# Patient Record
Sex: FEMALE | URBAN - METROPOLITAN AREA
[De-identification: names, ages, dates, MRNs, and addresses within clinical notes are randomized per-mention and may not be internally consistent; named-entity substitution may affect disease eponyms.]

---

## 2017-01-08 ENCOUNTER — TRANSFERRED RECORDS (OUTPATIENT)
Dept: HEALTH INFORMATION MANAGEMENT | Facility: CLINIC | Age: 82
End: 2017-01-08

## 2017-01-18 ENCOUNTER — TRANSFERRED RECORDS (OUTPATIENT)
Dept: HEALTH INFORMATION MANAGEMENT | Facility: CLINIC | Age: 82
End: 2017-01-18

## 2017-02-01 DIAGNOSIS — H40.2213 CHRONIC ANGLE-CLOSURE GLAUCOMA OF RIGHT EYE, SEVERE STAGE: Primary | ICD-10-CM

## 2017-02-02 ENCOUNTER — APPOINTMENT (OUTPATIENT)
Dept: OPHTHALMOLOGY | Facility: CLINIC | Age: 82
End: 2017-02-02
Attending: OPHTHALMOLOGY
Payer: COMMERCIAL

## 2017-02-02 DIAGNOSIS — H40.2213 CHRONIC ANGLE-CLOSURE GLAUCOMA OF RIGHT EYE, SEVERE STAGE: ICD-10-CM

## 2017-02-02 PROCEDURE — 99213 OFFICE O/P EST LOW 20 MIN: CPT | Mod: ZF

## 2017-02-02 PROCEDURE — 92083 EXTENDED VISUAL FIELD XM: CPT | Mod: ZF | Performed by: OPHTHALMOLOGY

## 2017-02-02 ASSESSMENT — TONOMETRY
OD_IOP_MMHG: 14
IOP_METHOD: TONOPEN
OS_IOP_MMHG: 07

## 2017-02-02 ASSESSMENT — REFRACTION_WEARINGRX
OS_SPHERE: BALANCE
OD_ADD: +3.00
OD_CYLINDER: +1.00
OD_AXIS: 095
OD_SPHERE: -3.00

## 2017-02-02 ASSESSMENT — VISUAL ACUITY
METHOD: SNELLEN - LINEAR
OS_CC: LP&P
OD_CC+: -1
OD_CC: 20/50

## 2017-02-02 ASSESSMENT — CONF VISUAL FIELD
OD_NORMAL: 1
OS_INFERIOR_TEMPORAL_RESTRICTION: 1
METHOD: COUNTING FINGERS
OS_INFERIOR_NASAL_RESTRICTION: 1
OS_SUPERIOR_NASAL_RESTRICTION: 1
OS_SUPERIOR_TEMPORAL_RESTRICTION: 1

## 2017-02-02 ASSESSMENT — SLIT LAMP EXAM - LIDS
COMMENTS: NORMAL
COMMENTS: NORMAL

## 2017-02-02 ASSESSMENT — CUP TO DISC RATIO: OD_RATIO: 0.8

## 2017-02-02 NOTE — PROGRESS NOTES
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 Palafox -- s/p Yag laser disruption of anterior hyaloid face (10/13/15) -- stable    Patient will continue on the Cosopt (Timolol/Dorzolamide) which is a blue top drop 2x/day (12 hours apart) in the right eye.  Patient will return to clinic in 4-6 months with repeat IOP check, Allen Visual Field test, and dilated eye exam.  Patient will also follow up with Dr. Moreno for retina evaluation.

## 2017-02-02 NOTE — MR AVS SNAPSHOT
After Visit Summary   2/2/2017    Sujata Valentino    MRN: [id number]           Patient Information     Date Of Birth          4/15/1926        Visit Information        Provider Department      2/2/2017 10:30 AM [facility]        Today's Diagnoses     Chronic angle-closure glaucoma of right eye, severe stage           Care Instructions    Patient will continue on the Cosopt (Timolol/Dorzolamide) which is a blue top drop 2x/day (12 hours apart) in the right eye.  Patient will return to clinic in 4-6 months with repeat IOP check, Allen Visual Field test, and dilated eye exam.  Patient will also follow up with Dr. Moreno for retina evaluation.        Follow-ups after your visit        Follow-up notes from your care team     Return for 4-6 months with repeat IOP check, Allen Visual Field test, and dilated eye exam.      Who to contact     Please call your clinic at [phone] to:    Ask questions about your health    Make or cancel appointments    Discuss your medicines    Learn about your test results    Speak to your doctor   If you have compliments or concerns about an experience at your clinic, or if you wish to file a complaint, please contact [facility] Patient Relations at [phone] or email us at [email or web address]         Additional Information About Your Visit        MyChart Information     [employer] gives you secure access to your electronic health record. If you see a primary care provider, you can also send messages to your care team and make appointments. If you have questions, please call your primary care clinic.  If you do not have a primary care provider, please call [phone] and they will assist you.      [employer] is an electronic gateway that provides easy, online access to your medical records. With [employer], you can request a clinic appointment, read your test results, renew a prescription or communicate with your  care team.     To access your existing account, please contact your [facility] or call [phone] for assistance.        Care EveryWhere ID     This is your Care EveryWhere ID. This could be used by other organizations to access your [place] medical records  [id number]         Blood Pressure from Last 3 Encounters:   No data found for BP    Weight from Last 3 Encounters:   No data found for Wt              We Performed the Following     Allen VF OU        Primary Care Provider    No Pcp Confirmed       No address on file        Thank you!     Thank you for choosing [facility]  for your care. Our goal is always to provide you with excellent care. Hearing back from our patients is one way we can continue to improve our services. Please take a few minutes to complete the written survey that you may receive in the mail after your visit with us. Thank you!             Your Updated Medication List - Protect others around you: Learn how to safely use, store and throw away your medicines at www.disposemymeds.org.          This list is accurate as of: 2/2/17 12:38 PM.  Always use your most recent med list.                   Brand Name Dispense Instructions for use    * acetaminophen 325 MG tablet    TYLENOL     Take 325-650 mg by mouth every 6 hours as needed       * acetaminophen 325 MG tablet    TYLENOL     Take 2 tablets by mouth as needed In addition to scheduled doses as ordered       alum & mag hydroxide-simethicone 200-200-20 MG/5ML Susp suspension    MYLANTA/MAALOX     Take 30 mLs by mouth every 4 hours as needed       aspirin 81 MG tablet      Take 1 tablet by mouth daily       atropine 1 % ophthalmic solution     1 Bottle    Place 1 drop into the right eye 2 times daily       donepezil 10 MG tablet    ARICEPT     Take 10 mg by mouth At Bedtime       dorzolamide 2 % ophthalmic solution    TRUSOPT     Place 1 drop into the right eye 2 times daily       dorzolamide-timolol 2-0.5 %  ophthalmic solution    COSOPT    1 Bottle    Place 1 drop into the right eye 2 times daily       fexofenadine 180 MG tablet    ALLEGRA     Take 1 tablet by mouth daily as needed       fluticasone 50 MCG/ACT spray    FLONASE     Spray 2 sprays into both nostrils daily       furosemide 20 MG tablet    LASIX     Take 20 mg by mouth daily       hypromellose 0.4 % Soln ophthalmic solution    ARTIFICIAL TEARS     Place 1 drop into both eyes every hour as needed       lisinopril 20 MG tablet    PRINIVIL/ZESTRIL     Take 20 mg by mouth 2 times daily       * loperamide 2 MG capsule    IMODIUM     Take 2 mg by mouth 4 times daily as needed       * IMODIUM A-D PO      Take 2 mg by mouth as needed As needed for diarrhea       melatonin 3 MG tablet      Take 1 tablet by mouth At Bedtime       polyethylene glycol powder    MIRALAX/GLYCOLAX     Take 1 capful by mouth daily as needed       prednisoLONE acetate 1 % ophthalmic susp    PRED FORTE     Place 1 drop into the right eye 5 times daily       PROBIOTIC DAILY PO      Take 1 capsule by mouth once Daily for diarrhea       sennosides 8.6 MG tablet    SENOKOT     Take 1 tablet by mouth every 12 hours as needed       sertraline 50 MG tablet    ZOLOFT     Take 1 tablet by mouth At Bedtime       simethicone 80 MG Tabs      Take 1 tablet by mouth 4 times daily       TAB-A-ARTEM Tabs      Take 1 tablet by mouth daily       traMADol 50 MG tablet    ULTRAM     Take 1 tablet by mouth every 6 hours as needed       * Notice:  This list has 4 medication(s) that are the same as other medications prescribed for you. Read the directions carefully, and ask your doctor or other care provider to review them with you.

## 2017-02-02 NOTE — NURSING NOTE
Chief Complaints and History of Present Illnesses   Patient presents with     Follow Up For     5 month f/u chronic angle closure glaucoma     HPI    Affected eye(s):  Both   Symptoms:     Blurred vision (Comment: still blurred hard to watch TV and read books)   Difficulty with reading   Difficulty watching television   No floaters   No flashes         Do you have eye pain now?:  No      Comments:  Patient notes she is using Cosopt RE GARRY Garcia February 2, 2017 10:59 AM

## 2020-03-10 ENCOUNTER — HEALTH MAINTENANCE LETTER (OUTPATIENT)
Age: 85
End: 2020-03-10

## 2020-12-27 ENCOUNTER — HEALTH MAINTENANCE LETTER (OUTPATIENT)
Age: 85
End: 2020-12-27

## 2021-04-24 ENCOUNTER — HEALTH MAINTENANCE LETTER (OUTPATIENT)
Age: 86
End: 2021-04-24

## 2021-10-04 ENCOUNTER — HEALTH MAINTENANCE LETTER (OUTPATIENT)
Age: 86
End: 2021-10-04

## 2022-05-15 ENCOUNTER — HEALTH MAINTENANCE LETTER (OUTPATIENT)
Age: 87
End: 2022-05-15

## 2022-09-11 ENCOUNTER — HEALTH MAINTENANCE LETTER (OUTPATIENT)
Age: 87
End: 2022-09-11